# Patient Record
Sex: MALE | Race: WHITE | Employment: FULL TIME | URBAN - METROPOLITAN AREA
[De-identification: names, ages, dates, MRNs, and addresses within clinical notes are randomized per-mention and may not be internally consistent; named-entity substitution may affect disease eponyms.]

---

## 2020-12-28 ENCOUNTER — APPOINTMENT (OUTPATIENT)
Dept: GENERAL RADIOLOGY | Age: 40
End: 2020-12-28
Attending: EMERGENCY MEDICINE
Payer: COMMERCIAL

## 2020-12-28 ENCOUNTER — HOSPITAL ENCOUNTER (EMERGENCY)
Age: 40
Discharge: HOME | End: 2020-12-28
Attending: EMERGENCY MEDICINE
Payer: COMMERCIAL

## 2020-12-28 VITALS
SYSTOLIC BLOOD PRESSURE: 143 MMHG | BODY MASS INDEX: 27.4 KG/M2 | WEIGHT: 185 LBS | HEART RATE: 63 BPM | HEIGHT: 69 IN | TEMPERATURE: 98.1 F | RESPIRATION RATE: 15 BRPM | OXYGEN SATURATION: 99 % | DIASTOLIC BLOOD PRESSURE: 92 MMHG

## 2020-12-28 DIAGNOSIS — U07.1 COVID-19: Primary | ICD-10-CM

## 2020-12-28 LAB
ALBUMIN SERPL-MCNC: 4.3 G/DL (ref 3.5–5)
ALBUMIN/GLOB SERPL: 1.2 {RATIO} (ref 1.2–3.5)
ALP SERPL-CCNC: 73 U/L (ref 50–136)
ALT SERPL-CCNC: 38 U/L (ref 12–65)
ANION GAP SERPL CALC-SCNC: 5 MMOL/L (ref 7–16)
AST SERPL-CCNC: 22 U/L (ref 15–37)
BASOPHILS # BLD: 0 K/UL (ref 0–0.2)
BASOPHILS NFR BLD: 0 % (ref 0–2)
BILIRUB SERPL-MCNC: 0.3 MG/DL (ref 0.2–1.1)
BUN SERPL-MCNC: 11 MG/DL (ref 6–23)
CALCIUM SERPL-MCNC: 8.9 MG/DL (ref 8.3–10.4)
CHLORIDE SERPL-SCNC: 104 MMOL/L (ref 98–107)
CO2 SERPL-SCNC: 29 MMOL/L (ref 21–32)
CREAT SERPL-MCNC: 1 MG/DL (ref 0.8–1.5)
DIFFERENTIAL METHOD BLD: ABNORMAL
EOSINOPHIL # BLD: 0 K/UL (ref 0–0.8)
EOSINOPHIL NFR BLD: 0 % (ref 0.5–7.8)
ERYTHROCYTE [DISTWIDTH] IN BLOOD BY AUTOMATED COUNT: 12.2 % (ref 11.9–14.6)
GLOBULIN SER CALC-MCNC: 3.6 G/DL (ref 2.3–3.5)
GLUCOSE SERPL-MCNC: 103 MG/DL (ref 65–100)
HCT VFR BLD AUTO: 43.6 % (ref 41.1–50.3)
HGB BLD-MCNC: 14.8 G/DL (ref 13.6–17.2)
IMM GRANULOCYTES # BLD AUTO: 0 K/UL (ref 0–0.5)
IMM GRANULOCYTES NFR BLD AUTO: 0 % (ref 0–5)
LYMPHOCYTES # BLD: 1.9 K/UL (ref 0.5–4.6)
LYMPHOCYTES NFR BLD: 22 % (ref 13–44)
MCH RBC QN AUTO: 30 PG (ref 26.1–32.9)
MCHC RBC AUTO-ENTMCNC: 33.9 G/DL (ref 31.4–35)
MCV RBC AUTO: 88.3 FL (ref 79.6–97.8)
MONOCYTES # BLD: 0.5 K/UL (ref 0.1–1.3)
MONOCYTES NFR BLD: 6 % (ref 4–12)
NEUTS SEG # BLD: 6.1 K/UL (ref 1.7–8.2)
NEUTS SEG NFR BLD: 71 % (ref 43–78)
NRBC # BLD: 0 K/UL (ref 0–0.2)
PLATELET # BLD AUTO: 246 K/UL (ref 150–450)
PMV BLD AUTO: 10.6 FL (ref 9.4–12.3)
POTASSIUM SERPL-SCNC: 3.7 MMOL/L (ref 3.5–5.1)
PROT SERPL-MCNC: 7.9 G/DL (ref 6.3–8.2)
RBC # BLD AUTO: 4.94 M/UL (ref 4.23–5.6)
SODIUM SERPL-SCNC: 138 MMOL/L (ref 138–145)
TROPONIN-HIGH SENSITIVITY: 7.7 PG/ML (ref 0–14)
TROPONIN-HIGH SENSITIVITY: 8.2 PG/ML (ref 0–14)
WBC # BLD AUTO: 8.6 K/UL (ref 4.3–11.1)

## 2020-12-28 PROCEDURE — 85025 COMPLETE CBC W/AUTO DIFF WBC: CPT

## 2020-12-28 PROCEDURE — 80053 COMPREHEN METABOLIC PANEL: CPT

## 2020-12-28 PROCEDURE — 71046 X-RAY EXAM CHEST 2 VIEWS: CPT

## 2020-12-28 PROCEDURE — 93005 ELECTROCARDIOGRAM TRACING: CPT | Performed by: EMERGENCY MEDICINE

## 2020-12-28 PROCEDURE — 84484 ASSAY OF TROPONIN QUANT: CPT

## 2020-12-28 PROCEDURE — 99284 EMERGENCY DEPT VISIT MOD MDM: CPT

## 2020-12-28 RX ORDER — AMLODIPINE BESYLATE 5 MG/1
5 TABLET ORAL DAILY
COMMUNITY
End: 2022-04-21

## 2020-12-28 RX ORDER — ONDANSETRON 4 MG/1
4 TABLET, ORALLY DISINTEGRATING ORAL
Qty: 8 TAB | Refills: 2 | Status: SHIPPED | OUTPATIENT
Start: 2020-12-28 | End: 2022-04-21

## 2020-12-28 NOTE — DISCHARGE INSTRUCTIONS
Follow-up with your doctor as needed. Return the emergency department if your symptoms worsen. Take Motrin 600 mg every 6 hours and/or Tylenol 1000 mg every 8 hours as needed for fever or body aches.

## 2020-12-28 NOTE — ED TRIAGE NOTES
Patient arrives ambulatory to triage with mask in place. Patient reports being notified he was positive for covid this am.  Patient reports chest pain with inspiration. Reports intermittent deep cough. No fever. Took aleve yesterday.

## 2020-12-28 NOTE — ED PROVIDER NOTES
Patient states he started having sinus congestion and clear rhinorrhea yesterday. He also has had a mild cough. He started having some aches and chills today. He went to an outpatient testing center and tested positive for COVID-19. He states that he yawned today and felt a tightness in his chest.  He got concerned about it and came here for evaluation. He states his wife also has COVID-19. She has had similar symptoms as well. No past medical history on file.     Past Surgical History:   Procedure Laterality Date    HX REFRACTIVE SURGERY           Family History:   Problem Relation Age of Onset    No Known Problems Mother     No Known Problems Father     No Known Problems Sister     No Known Problems Maternal Grandmother     No Known Problems Maternal Grandfather     Heart Disease Paternal Grandmother     Heart Attack Paternal Grandfather        Social History     Socioeconomic History    Marital status:      Spouse name: Not on file    Number of children: Not on file    Years of education: Not on file    Highest education level: Not on file   Occupational History    Not on file   Social Needs    Financial resource strain: Not on file    Food insecurity     Worry: Not on file     Inability: Not on file    Transportation needs     Medical: Not on file     Non-medical: Not on file   Tobacco Use    Smoking status: Never Smoker    Smokeless tobacco: Current User     Types: Chew   Substance and Sexual Activity    Alcohol use: Yes     Comment: Sometimes     Drug use: No    Sexual activity: Yes     Partners: Female   Lifestyle    Physical activity     Days per week: Not on file     Minutes per session: Not on file    Stress: Not on file   Relationships    Social connections     Talks on phone: Not on file     Gets together: Not on file     Attends Restorationism service: Not on file     Active member of club or organization: Not on file     Attends meetings of clubs or organizations: Not on file     Relationship status: Not on file    Intimate partner violence     Fear of current or ex partner: Not on file     Emotionally abused: Not on file     Physically abused: Not on file     Forced sexual activity: Not on file   Other Topics Concern    Not on file   Social History Narrative    Not on file         ALLERGIES: Patient has no known allergies. Review of Systems   Constitutional: Positive for chills. Negative for fever. Gastrointestinal: Negative for nausea and vomiting. All other systems reviewed and are negative. Vitals:    12/28/20 1434 12/28/20 1558 12/28/20 1627   BP: (!) 143/111 (!) 144/92 132/88   Pulse: 78 69 60   Resp: 18 18    Temp: 98.1 °F (36.7 °C)     SpO2: 99% 96% 98%   Weight: 83.9 kg (185 lb)     Height: 5' 9\" (1.753 m)              Physical Exam  Vitals signs and nursing note reviewed. Constitutional:       Appearance: Normal appearance. He is well-developed. HENT:      Head: Normocephalic and atraumatic. Eyes:      Conjunctiva/sclera: Conjunctivae normal.      Pupils: Pupils are equal, round, and reactive to light. Neck:      Musculoskeletal: Normal range of motion and neck supple. Cardiovascular:      Rate and Rhythm: Normal rate and regular rhythm. Pulses: Normal pulses. Heart sounds: Normal heart sounds. Pulmonary:      Effort: Pulmonary effort is normal. No respiratory distress. Musculoskeletal: Normal range of motion. Skin:     General: Skin is warm and dry. Neurological:      Mental Status: He is alert and oriented to person, place, and time. MDM  Number of Diagnoses or Management Options  COVID-19: new and does not require workup  Diagnosis management comments: 5:03 PM discussed results with patient, unremarkable work-up. He has a primary doctor he can follow-up with.   Low clinical suspicion for PE, PERC negative       Amount and/or Complexity of Data Reviewed  Clinical lab tests: ordered and reviewed    Risk of Complications, Morbidity, and/or Mortality  Presenting problems: moderate  Diagnostic procedures: moderate  Management options: moderate    Patient Progress  Patient progress: stable         Procedures

## 2020-12-28 NOTE — ED NOTES
I have reviewed discharge instructions with the patient. The patient verbalized understanding. Patient left ED via Discharge Method: ambulatory to Home with mom    Opportunity for questions and clarification provided. Patient given 1 scripts. To continue your aftercare when you leave the hospital, you may receive an automated call from our care team to check in on how you are doing. This is a free service and part of our promise to provide the best care and service to meet your aftercare needs.  If you have questions, or wish to unsubscribe from this service please call [phone]. Thank you for Choosing our [facility] Department.

## 2020-12-29 LAB
ATRIAL RATE: 69 BPM
CALCULATED P AXIS, ECG09: 62 DEGREES
CALCULATED R AXIS, ECG10: 76 DEGREES
CALCULATED T AXIS, ECG11: 57 DEGREES
DIAGNOSIS, 93000: NORMAL
P-R INTERVAL, ECG05: 164 MS
Q-T INTERVAL, ECG07: 378 MS
QRS DURATION, ECG06: 74 MS
QTC CALCULATION (BEZET), ECG08: 405 MS
VENTRICULAR RATE, ECG03: 69 BPM

## 2022-04-21 PROBLEM — Z76.89 ESTABLISHING CARE WITH NEW DOCTOR, ENCOUNTER FOR: Status: ACTIVE | Noted: 2022-04-21

## 2022-04-21 PROBLEM — M25.512 LEFT SHOULDER PAIN: Status: ACTIVE | Noted: 2022-04-21

## 2022-05-05 PROBLEM — Z23 ENCOUNTER FOR VACCINATION: Status: ACTIVE | Noted: 2022-05-05

## 2022-05-05 PROBLEM — Z11.59 ENCOUNTER FOR HEPATITIS C SCREENING TEST FOR LOW RISK PATIENT: Status: ACTIVE | Noted: 2022-05-05

## 2022-05-05 PROBLEM — Z28.29 REFUSAL OF INFLUENZA VACCINE BY PROVIDER: Status: ACTIVE | Noted: 2022-05-05

## 2022-05-05 PROBLEM — E55.9 VITAMIN D DEFICIENCY: Status: ACTIVE | Noted: 2022-05-05

## 2022-05-05 PROBLEM — Z72.0 TOBACCO USER: Status: ACTIVE | Noted: 2022-05-05

## 2022-05-05 PROBLEM — M54.50 CHRONIC MIDLINE LOW BACK PAIN WITHOUT SCIATICA: Status: ACTIVE | Noted: 2022-05-05

## 2022-05-05 PROBLEM — M25.512 CHRONIC LEFT SHOULDER PAIN: Status: ACTIVE | Noted: 2022-04-21

## 2022-05-05 PROBLEM — R42 DIZZINESS: Status: ACTIVE | Noted: 2022-05-05

## 2022-05-05 PROBLEM — G89.29 CHRONIC LEFT SHOULDER PAIN: Status: ACTIVE | Noted: 2022-04-21

## 2022-05-05 PROBLEM — G89.29 CHRONIC MIDLINE LOW BACK PAIN WITHOUT SCIATICA: Status: ACTIVE | Noted: 2022-05-05

## 2022-06-04 PROBLEM — Z11.59 ENCOUNTER FOR HEPATITIS C SCREENING TEST FOR LOW RISK PATIENT: Status: RESOLVED | Noted: 2022-05-05 | Resolved: 2022-06-04

## 2022-06-09 ENCOUNTER — OFFICE VISIT (OUTPATIENT)
Dept: ENT CLINIC | Age: 42
End: 2022-06-09
Payer: COMMERCIAL

## 2022-06-09 VITALS
HEIGHT: 69 IN | WEIGHT: 180 LBS | SYSTOLIC BLOOD PRESSURE: 124 MMHG | DIASTOLIC BLOOD PRESSURE: 78 MMHG | RESPIRATION RATE: 18 BRPM | BODY MASS INDEX: 26.66 KG/M2

## 2022-06-09 DIAGNOSIS — R42 DISEQUILIBRIUM: ICD-10-CM

## 2022-06-09 DIAGNOSIS — H90.3 SENSORINEURAL HEARING LOSS, BILATERAL: Primary | ICD-10-CM

## 2022-06-09 DIAGNOSIS — R42 DIZZINESS: ICD-10-CM

## 2022-06-09 DIAGNOSIS — H93.13 TINNITUS OF BOTH EARS: ICD-10-CM

## 2022-06-09 PROCEDURE — 92567 TYMPANOMETRY: CPT | Performed by: AUDIOLOGIST

## 2022-06-09 PROCEDURE — 92557 COMPREHENSIVE HEARING TEST: CPT | Performed by: AUDIOLOGIST

## 2022-06-09 PROCEDURE — 99204 OFFICE O/P NEW MOD 45 MIN: CPT | Performed by: STUDENT IN AN ORGANIZED HEALTH CARE EDUCATION/TRAINING PROGRAM

## 2022-06-09 ASSESSMENT — ENCOUNTER SYMPTOMS
EYE DISCHARGE: 0
ABDOMINAL PAIN: 0
COUGH: 0

## 2022-06-09 NOTE — PROGRESS NOTES
[address] ENT Office Note    Patient: Farhan Denney  MRN: [id number]  : 1980  Gender:  male  Vital Signs: /78   Resp 18   Ht 5' 9\" (1.753 m)   Wt 180 lb (81.6 kg)   BMI 26.58 kg/m²   Date: 2022    CC: dizziness     HPI:  Farhan Denney is a 39 y.o. male he endorses dizziness that started in April. It comes on quickly then abates quickly. He denies any inciting events. He denies vertigo. He has a h/o loud noise exposure. He has bilateral tinnitus R>L. He notes slight decreased hearing, right greater than left. He recently switched from amlodipine to losartan for hypertension and feels this may be contributing to his dizziness which he describes mostly as lightheadedness. Past Medical History, Past Surgical History, Family history, Social History, and Medications were all reviewed with the patient today and updated as necessary. Allergies   Allergen Reactions    Poison [place] Extract Itching     Patient Active Problem List   Diagnosis    Establishing care with new doctor, encounter for    Hypertension    Refusal of influenza vaccine by provider    Encounter for vaccination    Tobacco user    Chronic midline low back pain without sciatica    Chronic left shoulder pain    Vitamin D deficiency    Dizziness     Current Outpatient Medications   Medication Sig    losartan (COZAAR) 100 MG tablet Take 100 mg by mouth daily    Cholecalciferol 50 MCG (2000) CAPS Take 2,000 Units by mouth daily (Patient not taking: Reported on 2022)     No current facility-administered medications for this visit.      Past Medical History:   Diagnosis Date    Hypertension      Social History     Tobacco Use    Smoking status: Never Smoker    Smokeless tobacco: Current User   Substance Use Topics    Alcohol use: Yes     Past Surgical History:   Procedure Laterality Date    REFRACTIVE SURGERY       Family History   Problem Relation Age of Onset    No Known Problems Mother     Heart Disease Paternal Grandmother     No Known Problems Father     Heart Attack Paternal Grandfather     No Known Problems Sister     No Known Problems Maternal Grandmother     No Known Problems Maternal Grandfather         ROS:    Review of Systems   Constitutional: Negative for fever. HENT: Positive for tinnitus. Negative for ear discharge and ear pain. Eyes: Negative for discharge. Respiratory: Negative for cough. Cardiovascular: Negative for chest pain. Gastrointestinal: Negative for abdominal pain. Endocrine: Negative for cold intolerance. Genitourinary: Negative for difficulty urinating. Musculoskeletal: Negative for neck pain. Skin: Negative for rash. Allergic/Immunologic: Negative for environmental allergies. Neurological: Positive for dizziness. Hematological: Negative for adenopathy. Psychiatric/Behavioral: Negative for agitation. PHYSICAL EXAM:    /78   Resp 18   Ht 5' 9\" (1.753 m)   Wt 180 lb (81.6 kg)   BMI 26.58 kg/m²     General: NAD, well-appearing  Neuro: No gross neuro deficits. CN's II-XII intact. No facial weakness. Eyes: EOMI. Pupils reactive. No periorbital edema/ecchymosis. Skin: No facial erythema, rashes or concerning lesions. Nose: No external deviations or saddling. Intranasally, septum is midline without perforations, nasal mucosa appears healthy with no erythema, mucopurulence, or polyps. Mouth: Moist mucus membranes, normal tongue/palate mobility, no concerning mucosal lesions. Oropharynx: clear with no erythema/exudate, no tonsillar hypertrophy. Ears: Normal appearing auricles, no hematomas. EACs clear with no cerumen impaction, healthy canal skin, TM's intact with no perforations or retraction pockets. No middle ear effusions. Circle-Hallpike negative bilaterally  Neck: Soft, supple, no palpable lateral neck masses. No parotid or submandibular masses. No thyromegaly or palpable thyroid nodules. No surgical scars.   Lymphatics: No palpable cervical LAD.  Resp/Lungs: No audible stridor or wheezing, CTAB  Heart: RRR  Extremities: No clubbing or cyanosis. Tympanometry     Type A -  bilaterally     Audiometry     Test Performed - Comprehensive Audiogram     Type of Loss - Right Ear: abnormal hearing: degree of loss is normal to moderate sensorineural hearing loss                           Left Ear: abnormal hearing: degree of loss is normal to moderate sensorineural hearing loss     SRT   Measurement Right Ear Left Ear   Value 15 15   Unit dB dB      Discrimination  Measurement Right Ear Left Ear   Value 100% 100%   Unit dB dB       Results for Michelle Cartagena (MRN [id number]) as of 6/9/2022 18:35   Ref. Range 4/21/2022 09:27   TSH Latest Ref Range: 0.450 - 4.500 uIU/mL 0.981     Results for Michelle Cartagena (MRN [id number]) as of 6/9/2022 18:35   Ref. Range 4/21/2022 09:27   GLUCOSE, FASTING,GF Latest Ref Range: 65 - 99 mg/dL 84   Hemoglobin A1C Latest Ref Range: 4.8 - 5.6 % 5.4   eAG (mg/dL) Latest Units: mg/dL 108     Lab Results   Component Value Date     04/21/2022    K 4.7 04/21/2022    CL 99 04/21/2022    CO2 22 04/21/2022    BUN 10 04/21/2022    CREATININE 1.07 04/21/2022    GLUCOSE 84 04/21/2022    CALCIUM 10.0 04/21/2022        ASSESSMENT and PLAN  39year-old gentleman with bilateral high-frequency sensorineural hearing loss at 4000 Hz likely related to noise-induced hearing loss. He has associated tinnitus and I gave him information about treatment strategies. No vertigo and [place]-Hallpike negative today. Dizziness described as mostly lightheadedness and may be secondary to hypertension medications. He will follow back up as needed. ICD-10-CM    1. Sensorineural hearing loss, bilateral  H90.3 COMPREHENSIVE HEARING TEST     TYMPANOMETRY   2. Dizziness  R42    3. Tinnitus of both ears  H93.13    4.  Ned Kendrick MD  6/9/2022  Electronically signed

## 2022-06-09 NOTE — PROGRESS NOTES
Dot 44 had Tympanometry and Audiometry performed today. The patient reports dizziness and tinnitus. Results as follows:    Tympanometry    Type A -  bilaterally    Audiometry    Test Performed - Comprehensive Audiogram    Type of Loss - Right Ear: abnormal hearing: degree of loss is normal to moderate sensorineural hearing loss                           Left Ear: abnormal hearing: degree of loss is normal to moderate sensorineural hearing loss    SRT   Measurement Right Ear Left Ear   Value 15 15   Unit dB dB     Discrimination  Measurement Right Ear Left Ear   Value 100% 100%   Unit dB dB     Recommend  Further testing due to asymmetry and amplification following medical clearance    A.  [id number] [facility]  Audiologist

## 2022-11-07 ENCOUNTER — OFFICE VISIT (OUTPATIENT)
Dept: INTERNAL MEDICINE CLINIC | Facility: CLINIC | Age: 42
End: 2022-11-07
Payer: COMMERCIAL

## 2022-11-07 VITALS
SYSTOLIC BLOOD PRESSURE: 122 MMHG | HEIGHT: 69 IN | TEMPERATURE: 98.6 F | WEIGHT: 180 LBS | OXYGEN SATURATION: 97 % | BODY MASS INDEX: 26.66 KG/M2 | DIASTOLIC BLOOD PRESSURE: 80 MMHG | HEART RATE: 73 BPM

## 2022-11-07 DIAGNOSIS — I10 PRIMARY HYPERTENSION: ICD-10-CM

## 2022-11-07 DIAGNOSIS — R42 DISEQUILIBRIUM: ICD-10-CM

## 2022-11-07 DIAGNOSIS — I10 ESSENTIAL (PRIMARY) HYPERTENSION: Primary | ICD-10-CM

## 2022-11-07 DIAGNOSIS — R42 DIZZINESS: ICD-10-CM

## 2022-11-07 PROCEDURE — 99213 OFFICE O/P EST LOW 20 MIN: CPT | Performed by: INTERNAL MEDICINE

## 2022-11-07 PROCEDURE — 3078F DIAST BP <80 MM HG: CPT | Performed by: INTERNAL MEDICINE

## 2022-11-07 PROCEDURE — 3074F SYST BP LT 130 MM HG: CPT | Performed by: INTERNAL MEDICINE

## 2022-11-07 RX ORDER — LOSARTAN POTASSIUM 100 MG/1
100 TABLET ORAL DAILY
Qty: 90 TABLET | Refills: 3 | Status: SHIPPED | OUTPATIENT
Start: 2022-11-07 | End: 2023-11-07

## 2022-11-07 RX ORDER — LOSARTAN POTASSIUM 50 MG/1
50 TABLET ORAL DAILY
Qty: 30 TABLET | Refills: 2 | Status: CANCELLED | OUTPATIENT
Start: 2022-11-07 | End: 2023-02-05

## 2022-11-07 ASSESSMENT — PATIENT HEALTH QUESTIONNAIRE - PHQ9
SUM OF ALL RESPONSES TO PHQ QUESTIONS 1-9: 0
SUM OF ALL RESPONSES TO PHQ9 QUESTIONS 1 & 2: 0
2. FEELING DOWN, DEPRESSED OR HOPELESS: 0
1. LITTLE INTEREST OR PLEASURE IN DOING THINGS: 0
SUM OF ALL RESPONSES TO PHQ QUESTIONS 1-9: 0

## 2022-11-07 ASSESSMENT — ENCOUNTER SYMPTOMS
VOMITING: 0
EYE PAIN: 0
NAUSEA: 0
BACK PAIN: 0
SHORTNESS OF BREATH: 0
CONSTIPATION: 0
COUGH: 0
SINUS PAIN: 0
SINUS PRESSURE: 0
DIARRHEA: 0
ABDOMINAL PAIN: 0

## 2022-11-07 ASSESSMENT — ANXIETY QUESTIONNAIRES
2. NOT BEING ABLE TO STOP OR CONTROL WORRYING: 0
4. TROUBLE RELAXING: 0
IF YOU CHECKED OFF ANY PROBLEMS ON THIS QUESTIONNAIRE, HOW DIFFICULT HAVE THESE PROBLEMS MADE IT FOR YOU TO DO YOUR WORK, TAKE CARE OF THINGS AT HOME, OR GET ALONG WITH OTHER PEOPLE: NOT DIFFICULT AT ALL
7. FEELING AFRAID AS IF SOMETHING AWFUL MIGHT HAPPEN: 0
6. BECOMING EASILY ANNOYED OR IRRITABLE: 0
5. BEING SO RESTLESS THAT IT IS HARD TO SIT STILL: 0
1. FEELING NERVOUS, ANXIOUS, OR ON EDGE: 0
GAD7 TOTAL SCORE: 0
3. WORRYING TOO MUCH ABOUT DIFFERENT THINGS: 0

## 2022-11-07 NOTE — PROGRESS NOTES
Victor M Solisdon (: 1980) presents today for:  Chief Complaint   Patient presents with    Hypertension     Follow up          Patient is a 51-year-old male with a medical history significant for hypertension, chronic low back pain without sciatica, left shoulder pain, dizziness presents for follow-up visit. At last office visit patient with concerns of dizziness while utilizing Norvasc. Patient transition to losartan with continued dizziness and a fall from a ladder. Patient referred to ear nose and throat at last office visit. Abnormal sensorineural hearing loss present bilaterally described as moderate. Patient is evaluation by ear nose and throat revealing no contributing factors to his disequilibrium. Patient previously with prescription for losartan 100 daily. Patients disequilibrium has resolved. BP well controlled in office today. Requesting patient obtain home BP monitoring device. If patient experience white coat HTN and with normal readings in office, he may be hypotensive away from office and contributing to disequilibrium. Assessment/Plan:  1. Essential (primary) hypertension    2. Disequilibrium    3. Primary hypertension    4. Dizziness        No orders of the defined types were placed in this encounter. No orders of the defined types were placed in this encounter. Return in about 1 year (around 2023). Reviewed and updated this visit by provider:         Review of Systems   Constitutional:  Negative for chills and fever. HENT:  Negative for hearing loss, postnasal drip, sinus pressure and sinus pain. Eyes:  Negative for pain and visual disturbance. Respiratory:  Negative for cough and shortness of breath. Cardiovascular:  Negative for chest pain and palpitations. Gastrointestinal:  Negative for abdominal pain, constipation, diarrhea, nausea and vomiting. Endocrine: Negative for polyuria.    Genitourinary:  Negative for difficulty urinating, frequency and urgency. Musculoskeletal:  Negative for arthralgias, back pain and neck pain. Skin:  Negative for rash and wound. Neurological:  Negative for dizziness, weakness and headaches. Psychiatric/Behavioral:  Negative for behavioral problems and sleep disturbance. The patient is not nervous/anxious. Visit Vitals  /80   Pulse 73   Temp 98.6 °F (37 °C) (Temporal)   Ht 5' 9\" (1.753 m)   Wt 180 lb (81.6 kg)   SpO2 97%   BMI 26.58 kg/m²       Physical Exam  Vitals reviewed. Constitutional:       General: He is not in acute distress. Appearance: Normal appearance. HENT:      Head: Normocephalic and atraumatic. Right Ear: External ear normal.      Left Ear: External ear normal.      Nose: Nose normal.      Mouth/Throat:      Mouth: Mucous membranes are moist.      Pharynx: Oropharynx is clear. Eyes:      Extraocular Movements: Extraocular movements intact. Conjunctiva/sclera: Conjunctivae normal.   Cardiovascular:      Rate and Rhythm: Normal rate and regular rhythm. Pulses: Normal pulses. Heart sounds: Normal heart sounds. Pulmonary:      Effort: Pulmonary effort is normal.      Breath sounds: Normal breath sounds. No wheezing. Abdominal:      General: Bowel sounds are normal.      Tenderness: There is no abdominal tenderness. Musculoskeletal:         General: No swelling or deformity. Normal range of motion. Cervical back: Normal range of motion. Skin:     General: Skin is warm and dry. Coloration: Skin is not jaundiced. Neurological:      General: No focal deficit present. Mental Status: He is alert and oriented to person, place, and time. Mental status is at baseline. Psychiatric:         Mood and Affect: Mood normal.         Behavior: Behavior normal.         Thought Content:  Thought content normal.           Nicolas Fang DO

## 2022-11-07 NOTE — ASSESSMENT & PLAN NOTE
Patient with reported dizziness while utilizing Norvasc. Transition to losartan with similar symptoms. Patient referred to ear nose and throat. Negative Kyra-Hallpike testing. No other contributing factors found for patient's dizziness. Moderate sensorineural hearing loss bilaterally present. We will decrease dosing from 100 of losartan to 50 daily.

## 2022-11-07 NOTE — ASSESSMENT & PLAN NOTE
Patient with disequilibrium utilizing Norvasc. Transition to losartan resulted in the same. Evaluation by ear nose and throat revealing no contributing factors to his disequilibrium. Audiology revealing moderate sensorineural hearing loss bilaterally. We will dose decrease losartan 100-50. Recommending patient obtain home blood pressure monitoring device for titration at home.

## 2023-05-24 ENCOUNTER — OFFICE VISIT (OUTPATIENT)
Dept: INTERNAL MEDICINE CLINIC | Facility: CLINIC | Age: 43
End: 2023-05-24
Payer: COMMERCIAL

## 2023-05-24 VITALS
HEART RATE: 66 BPM | DIASTOLIC BLOOD PRESSURE: 91 MMHG | WEIGHT: 184.8 LBS | SYSTOLIC BLOOD PRESSURE: 125 MMHG | BODY MASS INDEX: 27.37 KG/M2 | TEMPERATURE: 98.5 F | HEIGHT: 69 IN | OXYGEN SATURATION: 98 %

## 2023-05-24 DIAGNOSIS — F41.0 PANIC ATTACK: ICD-10-CM

## 2023-05-24 DIAGNOSIS — F41.1 GENERALIZED ANXIETY DISORDER: ICD-10-CM

## 2023-05-24 DIAGNOSIS — I10 PRIMARY HYPERTENSION: Primary | ICD-10-CM

## 2023-05-24 DIAGNOSIS — G89.29 CHRONIC PAIN OF BOTH KNEES: ICD-10-CM

## 2023-05-24 DIAGNOSIS — E55.9 VITAMIN D DEFICIENCY: ICD-10-CM

## 2023-05-24 DIAGNOSIS — M25.561 CHRONIC PAIN OF BOTH KNEES: ICD-10-CM

## 2023-05-24 DIAGNOSIS — M25.562 CHRONIC PAIN OF BOTH KNEES: ICD-10-CM

## 2023-05-24 DIAGNOSIS — R07.89 CHEST DISCOMFORT: ICD-10-CM

## 2023-05-24 DIAGNOSIS — R53.82 CHRONIC FATIGUE: ICD-10-CM

## 2023-05-24 PROCEDURE — 3074F SYST BP LT 130 MM HG: CPT | Performed by: INTERNAL MEDICINE

## 2023-05-24 PROCEDURE — 3080F DIAST BP >= 90 MM HG: CPT | Performed by: INTERNAL MEDICINE

## 2023-05-24 PROCEDURE — 99214 OFFICE O/P EST MOD 30 MIN: CPT | Performed by: INTERNAL MEDICINE

## 2023-05-24 RX ORDER — BUPROPION HYDROCHLORIDE 150 MG/1
150 TABLET ORAL EVERY MORNING
Qty: 30 TABLET | Refills: 3 | Status: SHIPPED | OUTPATIENT
Start: 2023-05-24

## 2023-05-24 RX ORDER — AMLODIPINE BESYLATE 5 MG/1
5 TABLET ORAL DAILY
Qty: 90 TABLET | Refills: 1 | Status: SHIPPED | OUTPATIENT
Start: 2023-05-24

## 2023-05-24 RX ORDER — HYDROXYZINE HYDROCHLORIDE 25 MG/1
25 TABLET, FILM COATED ORAL 2 TIMES DAILY PRN
Qty: 60 TABLET | Refills: 11 | Status: SHIPPED | OUTPATIENT
Start: 2023-05-24

## 2023-05-24 RX ORDER — MULTIVIT WITH MIN/MFOLATE/K2 340-15/3 G
5000 POWDER (GRAM) ORAL DAILY
Qty: 90 CAPSULE | Refills: 3 | Status: SHIPPED | OUTPATIENT
Start: 2023-05-24

## 2023-05-24 ASSESSMENT — PATIENT HEALTH QUESTIONNAIRE - PHQ9
9. THOUGHTS THAT YOU WOULD BE BETTER OFF DEAD, OR OF HURTING YOURSELF: 0
2. FEELING DOWN, DEPRESSED OR HOPELESS: 2
7. TROUBLE CONCENTRATING ON THINGS, SUCH AS READING THE NEWSPAPER OR WATCHING TELEVISION: 3
6. FEELING BAD ABOUT YOURSELF - OR THAT YOU ARE A FAILURE OR HAVE LET YOURSELF OR YOUR FAMILY DOWN: 0
8. MOVING OR SPEAKING SO SLOWLY THAT OTHER PEOPLE COULD HAVE NOTICED. OR THE OPPOSITE, BEING SO FIGETY OR RESTLESS THAT YOU HAVE BEEN MOVING AROUND A LOT MORE THAN USUAL: 3
SUM OF ALL RESPONSES TO PHQ9 QUESTIONS 1 & 2: 5
SUM OF ALL RESPONSES TO PHQ QUESTIONS 1-9: 17
5. POOR APPETITE OR OVEREATING: 0
10. IF YOU CHECKED OFF ANY PROBLEMS, HOW DIFFICULT HAVE THESE PROBLEMS MADE IT FOR YOU TO DO YOUR WORK, TAKE CARE OF THINGS AT HOME, OR GET ALONG WITH OTHER PEOPLE: 3
3. TROUBLE FALLING OR STAYING ASLEEP: 3
SUM OF ALL RESPONSES TO PHQ QUESTIONS 1-9: 17
1. LITTLE INTEREST OR PLEASURE IN DOING THINGS: 3
4. FEELING TIRED OR HAVING LITTLE ENERGY: 3

## 2023-05-24 ASSESSMENT — ANXIETY QUESTIONNAIRES
4. TROUBLE RELAXING: 3
GAD7 TOTAL SCORE: 16
5. BEING SO RESTLESS THAT IT IS HARD TO SIT STILL: 1
3. WORRYING TOO MUCH ABOUT DIFFERENT THINGS: 3
7. FEELING AFRAID AS IF SOMETHING AWFUL MIGHT HAPPEN: 3
IF YOU CHECKED OFF ANY PROBLEMS ON THIS QUESTIONNAIRE, HOW DIFFICULT HAVE THESE PROBLEMS MADE IT FOR YOU TO DO YOUR WORK, TAKE CARE OF THINGS AT HOME, OR GET ALONG WITH OTHER PEOPLE: EXTREMELY DIFFICULT
2. NOT BEING ABLE TO STOP OR CONTROL WORRYING: 1
1. FEELING NERVOUS, ANXIOUS, OR ON EDGE: 2
6. BECOMING EASILY ANNOYED OR IRRITABLE: 3

## 2023-05-24 NOTE — PROGRESS NOTES
Rhianna Jansen (: 1980) is a 43 y.o. male, here for evaluation of the following chief complaint(s): Anxiety (Pt states sx's have been present x 2 weeks and states he had a recent panic attack on ), Depression (Pt states sx's have been present over 2 weeks.), Hypertension (Pt states he's been taking medication and has improved his diet.), and Dizziness (Sx's have been present since taking Losartan and is worsening./)       ASSESSMENT/PLAN:  1. Primary hypertension  Assessment & Plan:     Key Anti-Hypertensive Meds            losartan (COZAAR) 100 MG tablet    Sig - Route: Take 1 tablet by mouth daily - Oral          Well controlled at 2022 visit. Orders:  -     amLODIPine (NORVASC) 5 MG tablet; Take 1 tablet by mouth daily, Disp-90 tablet, R-1Normal  2. Vitamin D deficiency  Assessment & Plan:     Lab Results   Component Value Date/Time    VITD25 22.0 2022 09:27 AM      Recommending 5000 IU D3 daily. Orders:  -     vitamin D (D3 5000) 125 MCG (5000 UT) CAPS capsule; Take 1 capsule by mouth daily, Disp-90 capsule, R-3Normal  3. Generalized anxiety disorder  Assessment & Plan:  Patient with concerns of worsening anxiety symptoms since last office visit. Orders:  -     hydrOXYzine HCl (ATARAX) 25 MG tablet; Take 1 tablet by mouth 2 times daily as needed for Anxiety, Disp-60 tablet, R-11Normal  -     buPROPion (WELLBUTRIN XL) 150 MG extended release tablet; Take 1 tablet by mouth every morning, Disp-30 tablet, R-3Normal  4. Panic attack  -     hydrOXYzine HCl (ATARAX) 25 MG tablet; Take 1 tablet by mouth 2 times daily as needed for Anxiety, Disp-60 tablet, R-11Normal  5. Chronic pain of both knees  6. Chronic fatigue  7. Chest discomfort  Assessment & Plan:  Patient has taken Esomeprazole for symptomatic relief. He has not had any resolution of symptoms. Addressing anxiety with Atarax at this time. He will begin as soon as he has in possession.  Will offer wellburin which he is willing to

## 2023-05-24 NOTE — ASSESSMENT & PLAN NOTE
Key Anti-Hypertensive Meds          losartan (COZAAR) 100 MG tablet    Sig - Route: Take 1 tablet by mouth daily - Oral        Well controlled at 11/2022 visit.

## 2023-05-24 NOTE — ASSESSMENT & PLAN NOTE
Patient has taken Esomeprazole for symptomatic relief. He has not had any resolution of symptoms. Addressing anxiety with Atarax at this time. He will begin as soon as he has in possession. Will offer wellburin which he is willing to start in 2 weeks time.

## 2023-05-24 NOTE — ACP (ADVANCE CARE PLANNING)
Advance Care Planning   The patient has the following advanced directives on file:  Advance Directives       Power of [address] ACP-Advance Directive ACP-Power of     Not on File Not on File Not on File Not on File            The patient has appointed the following active healthcare agents:    Primary Decision Maker: Deja Loja - [phone]    The Patient has the following current code status:    Code Status: Not on file    Visit Documentation:  I discussed [address] with Jonas Bobby Ramos today which included the importance of making their choices for care and treatment in the case of a health event that adversely affects their decision-making abilities. He has not completed the Advance Care Directives. He does not have an active health care agent at this time. Jonas Bobby Ramos was encouraged to complete the declaration forms and provide a signed copy of his medical records.          Maribell Granados  5/24/2023

## 2023-07-05 ENCOUNTER — OFFICE VISIT (OUTPATIENT)
Dept: INTERNAL MEDICINE CLINIC | Facility: CLINIC | Age: 43
End: 2023-07-05
Payer: COMMERCIAL

## 2023-07-05 VITALS
HEIGHT: 69 IN | HEART RATE: 73 BPM | TEMPERATURE: 98.3 F | BODY MASS INDEX: 27.99 KG/M2 | OXYGEN SATURATION: 97 % | DIASTOLIC BLOOD PRESSURE: 97 MMHG | SYSTOLIC BLOOD PRESSURE: 145 MMHG | WEIGHT: 189 LBS

## 2023-07-05 DIAGNOSIS — I10 PRIMARY HYPERTENSION: Primary | ICD-10-CM

## 2023-07-05 DIAGNOSIS — F41.1 GENERALIZED ANXIETY DISORDER: ICD-10-CM

## 2023-07-05 DIAGNOSIS — E55.9 VITAMIN D DEFICIENCY: ICD-10-CM

## 2023-07-05 PROCEDURE — 99214 OFFICE O/P EST MOD 30 MIN: CPT | Performed by: INTERNAL MEDICINE

## 2023-07-05 PROCEDURE — 3080F DIAST BP >= 90 MM HG: CPT | Performed by: INTERNAL MEDICINE

## 2023-07-05 PROCEDURE — 3077F SYST BP >= 140 MM HG: CPT | Performed by: INTERNAL MEDICINE

## 2023-07-05 RX ORDER — BUSPIRONE HYDROCHLORIDE 5 MG/1
5 TABLET ORAL 3 TIMES DAILY PRN
Qty: 90 TABLET | Refills: 0 | Status: SHIPPED | OUTPATIENT
Start: 2023-07-05 | End: 2023-08-04

## 2023-07-05 RX ORDER — AMLODIPINE BESYLATE 10 MG/1
10 TABLET ORAL DAILY
Qty: 90 TABLET | Refills: 1 | Status: SHIPPED | OUTPATIENT
Start: 2023-07-05

## 2023-07-05 ASSESSMENT — PATIENT HEALTH QUESTIONNAIRE - PHQ9
2. FEELING DOWN, DEPRESSED OR HOPELESS: 0
3. TROUBLE FALLING OR STAYING ASLEEP: 0
SUM OF ALL RESPONSES TO PHQ QUESTIONS 1-9: 0
SUM OF ALL RESPONSES TO PHQ QUESTIONS 1-9: 0
6. FEELING BAD ABOUT YOURSELF - OR THAT YOU ARE A FAILURE OR HAVE LET YOURSELF OR YOUR FAMILY DOWN: 0
SUM OF ALL RESPONSES TO PHQ9 QUESTIONS 1 & 2: 0
SUM OF ALL RESPONSES TO PHQ QUESTIONS 1-9: 0
1. LITTLE INTEREST OR PLEASURE IN DOING THINGS: 0
SUM OF ALL RESPONSES TO PHQ QUESTIONS 1-9: 0
5. POOR APPETITE OR OVEREATING: 0
4. FEELING TIRED OR HAVING LITTLE ENERGY: 0

## 2023-07-05 NOTE — ASSESSMENT & PLAN NOTE
Elevated diastolic recently. Key Anti-Hypertensive Meds          amLODIPine (NORVASC) 5 MG tablet    Sig - Route: Take 1 tablet by mouth daily - Oral      Increasing norvasc to 10 mg daily.

## 2023-07-05 NOTE — ASSESSMENT & PLAN NOTE
Lab Results   Component Value Date/Time    VITD25 22.0 04/21/2022 09:27 AM      .Supplementing with 5000 IU D3.

## 2023-07-05 NOTE — PROGRESS NOTES
Janet Vaughan (: 1980) is a 43 y.o. male, here for evaluation of the following chief complaint(s):  Depression (Pt states he feels medication is helping) and Anxiety (Sx's are still present but not as bad.)       ASSESSMENT/PLAN:  1. Primary hypertension  Assessment & Plan:   Elevated diastolic recently. Key Anti-Hypertensive Meds            amLODIPine (NORVASC) 5 MG tablet    Sig - Route: Take 1 tablet by mouth daily - Oral        Increasing norvasc to 10 mg daily. Orders:  -     amLODIPine (NORVASC) 10 MG tablet; Take 1 tablet by mouth daily, Disp-90 tablet, R-1Normal  2. Vitamin D deficiency  Assessment & Plan:     Lab Results   Component Value Date/Time    VITD25 22.0 2022 09:27 AM      .Supplementing with 5000 IU D3.   3. Generalized anxiety disorder  Assessment & Plan:  Patient with anxiety prominent. Tolerant of Wellbutrin 150 daily. Patient has been tolerant of Atarax 25 bid prn as well. Doing well with wellbutrin. Sleeping well. Forward thinking. Health conscious. Will offer wean once stable for 6 consecutive months. Anxiety prominent currently Atarax with heavy sedation properties for patient. Offering Buspar 5 mg tid prn at this time. Orders:  -     busPIRone (BUSPAR) 5 MG tablet; Take 1 tablet by mouth 3 times daily as needed (anxiety), Disp-90 tablet, R-0Normal             SUBJECTIVE/OBJECTIVE:  HPI: Patient is a pleasant 41-year-old male with a medical history significant for hypertension, generalized anxiety disorder with panic features, and chronic pain of both knees that presents for routine follow-up visit. Patient has been maintained on Wellbutrin 150 with hydroxyzine 25 twice daily as needed. Patients blood pressure at home is much better. Patient has been doing a really good job identifying his anxiety triggers. He has found avoidance of those triggers to be very helpful. His father, caffeine, alcohol the night prior.   Offering Buspar 5 mf tid prn at this time

## 2023-07-05 NOTE — ACP (ADVANCE CARE PLANNING)
Advance Care Planning   The patient has the following advanced directives on file:  Advance Directives       Power of [address] [place] ACP-Advance Directive ACP-Power of     Not on File Not on File Not on File Not on File            The patient has appointed the following active healthcare agents:    Primary Decision Maker: Charis Sanchez - [phone]    The Patient has the following current code status:    Code Status: Not on file    Visit Documentation:  I discussed [address] [place] [address] with [address] today which included the importance of making their choices for care and treatment in the case of a health event that adversely affects their decision-making abilities. He has not completed the Advance Care Directives. He does not have an active health care agent at this time. [address] was encouraged to complete the declaration forms and provide a signed copy of his medical records.       Maribell Granados  7/5/2023

## 2023-07-05 NOTE — ASSESSMENT & PLAN NOTE
Patient with anxiety prominent. Tolerant of Wellbutrin 150 daily. Patient has been tolerant of Atarax 25 bid prn as well. Doing well with wellbutrin. Sleeping well. Forward thinking. Health conscious. Will offer wean once stable for 6 consecutive months. Anxiety prominent currently Atarax with heavy sedation properties for patient. Offering Buspar 5 mg tid prn at this time.

## 2023-08-16 DIAGNOSIS — F41.1 GENERALIZED ANXIETY DISORDER: ICD-10-CM

## 2023-08-16 RX ORDER — BUPROPION HYDROCHLORIDE 150 MG/1
150 TABLET ORAL EVERY MORNING
Qty: 30 TABLET | Refills: 0 | Status: SHIPPED | OUTPATIENT
Start: 2023-08-16

## 2023-11-02 ENCOUNTER — OFFICE VISIT (OUTPATIENT)
Dept: INTERNAL MEDICINE CLINIC | Facility: CLINIC | Age: 43
End: 2023-11-02
Payer: COMMERCIAL

## 2023-11-02 VITALS
OXYGEN SATURATION: 97 % | TEMPERATURE: 98.4 F | DIASTOLIC BLOOD PRESSURE: 87 MMHG | HEIGHT: 69 IN | HEART RATE: 76 BPM | WEIGHT: 184 LBS | BODY MASS INDEX: 27.25 KG/M2 | SYSTOLIC BLOOD PRESSURE: 127 MMHG

## 2023-11-02 DIAGNOSIS — E55.9 VITAMIN D DEFICIENCY: ICD-10-CM

## 2023-11-02 DIAGNOSIS — I10 PRIMARY HYPERTENSION: ICD-10-CM

## 2023-11-02 PROCEDURE — 3074F SYST BP LT 130 MM HG: CPT | Performed by: INTERNAL MEDICINE

## 2023-11-02 PROCEDURE — 3079F DIAST BP 80-89 MM HG: CPT | Performed by: INTERNAL MEDICINE

## 2023-11-02 PROCEDURE — 99214 OFFICE O/P EST MOD 30 MIN: CPT | Performed by: INTERNAL MEDICINE

## 2023-11-02 RX ORDER — AMLODIPINE BESYLATE 5 MG/1
5 TABLET ORAL DAILY
Qty: 90 TABLET | Refills: 1 | Status: SHIPPED | OUTPATIENT
Start: 2023-11-02

## 2023-11-02 RX ORDER — BUTYROSPERMUM PARKII(SHEA BUTTER), SIMMONDSIA CHINENSIS (JOJOBA) SEED OIL, ALOE BARBADENSIS LEAF EXTRACT .01; 1; 3.5 G/100G; G/100G; G/100G
5000 LIQUID TOPICAL DAILY
Qty: 90 CAPSULE | Refills: 3 | Status: SHIPPED | OUTPATIENT
Start: 2023-11-02

## 2023-11-02 SDOH — ECONOMIC STABILITY: INCOME INSECURITY: HOW HARD IS IT FOR YOU TO PAY FOR THE VERY BASICS LIKE FOOD, HOUSING, MEDICAL CARE, AND HEATING?: NOT HARD AT ALL

## 2023-11-02 SDOH — ECONOMIC STABILITY: FOOD INSECURITY: WITHIN THE PAST 12 MONTHS, THE FOOD YOU BOUGHT JUST DIDN'T LAST AND YOU DIDN'T HAVE MONEY TO GET MORE.: NEVER TRUE

## 2023-11-02 SDOH — ECONOMIC STABILITY: FOOD INSECURITY: WITHIN THE PAST 12 MONTHS, YOU WORRIED THAT YOUR FOOD WOULD RUN OUT BEFORE YOU GOT MONEY TO BUY MORE.: NEVER TRUE

## 2023-11-02 SDOH — ECONOMIC STABILITY: HOUSING INSECURITY
IN THE LAST 12 MONTHS, WAS THERE A TIME WHEN YOU DID NOT HAVE A STEADY PLACE TO SLEEP OR SLEPT IN A SHELTER (INCLUDING NOW)?: NO

## 2023-11-02 ASSESSMENT — PATIENT HEALTH QUESTIONNAIRE - PHQ9
SUM OF ALL RESPONSES TO PHQ QUESTIONS 1-9: 0
1. LITTLE INTEREST OR PLEASURE IN DOING THINGS: 0
SUM OF ALL RESPONSES TO PHQ9 QUESTIONS 1 & 2: 0
2. FEELING DOWN, DEPRESSED OR HOPELESS: 0
SUM OF ALL RESPONSES TO PHQ QUESTIONS 1-9: 0

## 2023-11-02 ASSESSMENT — ANXIETY QUESTIONNAIRES
GAD7 TOTAL SCORE: 0
3. WORRYING TOO MUCH ABOUT DIFFERENT THINGS: 0
6. BECOMING EASILY ANNOYED OR IRRITABLE: 0
5. BEING SO RESTLESS THAT IT IS HARD TO SIT STILL: 0
7. FEELING AFRAID AS IF SOMETHING AWFUL MIGHT HAPPEN: 0
2. NOT BEING ABLE TO STOP OR CONTROL WORRYING: 0
1. FEELING NERVOUS, ANXIOUS, OR ON EDGE: 0
IF YOU CHECKED OFF ANY PROBLEMS ON THIS QUESTIONNAIRE, HOW DIFFICULT HAVE THESE PROBLEMS MADE IT FOR YOU TO DO YOUR WORK, TAKE CARE OF THINGS AT HOME, OR GET ALONG WITH OTHER PEOPLE: NOT DIFFICULT AT ALL
4. TROUBLE RELAXING: 0

## 2023-11-02 NOTE — PROGRESS NOTES
Skin:     General: Skin is warm and dry. Neurological:      Mental Status: He is alert. An electronic signature was used to authenticate this note.   Valeri Power DO

## 2023-12-29 DIAGNOSIS — I10 PRIMARY HYPERTENSION: ICD-10-CM

## 2023-12-29 RX ORDER — AMLODIPINE BESYLATE 10 MG/1
10 TABLET ORAL DAILY
Qty: 90 TABLET | Refills: 1 | OUTPATIENT
Start: 2023-12-29

## 2024-01-29 DIAGNOSIS — I10 PRIMARY HYPERTENSION: ICD-10-CM

## 2024-01-30 RX ORDER — AMLODIPINE BESYLATE 5 MG/1
5 TABLET ORAL DAILY
Qty: 90 TABLET | Refills: 1 | Status: SHIPPED | OUTPATIENT
Start: 2024-01-30

## 2024-01-30 NOTE — TELEPHONE ENCOUNTER
As of last office note (11/02/2023), patient is still taking this medication and will be out of medication by their appointment (05/02/2024). Pended medication has correct quantity and pended to preferred pharmacy.    
[place]

## 2024-05-02 ENCOUNTER — OFFICE VISIT (OUTPATIENT)
Dept: INTERNAL MEDICINE CLINIC | Facility: CLINIC | Age: 44
End: 2024-05-02
Payer: COMMERCIAL

## 2024-05-02 VITALS
OXYGEN SATURATION: 98 % | HEIGHT: 69 IN | BODY MASS INDEX: 27.34 KG/M2 | RESPIRATION RATE: 16 BRPM | WEIGHT: 184.6 LBS | HEART RATE: 70 BPM | SYSTOLIC BLOOD PRESSURE: 135 MMHG | DIASTOLIC BLOOD PRESSURE: 91 MMHG

## 2024-05-02 DIAGNOSIS — I10 PRIMARY HYPERTENSION: ICD-10-CM

## 2024-05-02 DIAGNOSIS — F41.9 ANXIETY: Primary | ICD-10-CM

## 2024-05-02 DIAGNOSIS — E55.9 VITAMIN D DEFICIENCY: ICD-10-CM

## 2024-05-02 PROCEDURE — 3080F DIAST BP >= 90 MM HG: CPT | Performed by: INTERNAL MEDICINE

## 2024-05-02 PROCEDURE — 99214 OFFICE O/P EST MOD 30 MIN: CPT | Performed by: INTERNAL MEDICINE

## 2024-05-02 PROCEDURE — 3075F SYST BP GE 130 - 139MM HG: CPT | Performed by: INTERNAL MEDICINE

## 2024-05-02 RX ORDER — BUSPIRONE HYDROCHLORIDE 5 MG/1
5 TABLET ORAL 3 TIMES DAILY
Qty: 270 TABLET | Refills: 1 | Status: SHIPPED | OUTPATIENT
Start: 2024-05-02 | End: 2024-10-29

## 2024-05-02 RX ORDER — BUTYROSPERMUM PARKII(SHEA BUTTER), SIMMONDSIA CHINENSIS (JOJOBA) SEED OIL, ALOE BARBADENSIS LEAF EXTRACT .01; 1; 3.5 G/100G; G/100G; G/100G
5000 LIQUID TOPICAL DAILY
Qty: 90 CAPSULE | Refills: 3 | Status: SHIPPED | OUTPATIENT
Start: 2024-05-02

## 2024-05-02 RX ORDER — AMLODIPINE BESYLATE 5 MG/1
5 TABLET ORAL DAILY
Qty: 90 TABLET | Refills: 1 | Status: SHIPPED | OUTPATIENT
Start: 2024-05-02

## 2024-05-02 NOTE — PROGRESS NOTES
Reinaldo Snow (: 1980) is a 43 y.o. male, here for evaluation of the following chief complaint(s):  Hypertension       ASSESSMENT/PLAN:  1. Anxiety  -     busPIRone (BUSPAR) 5 MG tablet; Take 1 tablet by mouth 3 times daily, Disp-270 tablet, R-1Normal  2. Vitamin D deficiency  -     vitamin D (D3 5000) 125 MCG (5000 UT) CAPS capsule; Take 1 capsule by mouth daily, Disp-90 capsule, R-3Normal  3. Primary hypertension  -     amLODIPine (NORVASC) 5 MG tablet; Take 1 tablet by mouth daily, Disp-90 tablet, R-1Normal             SUBJECTIVE/OBJECTIVE:  HPI: Patient is a very pleasant 43-year-old male with medical history significant for anxiety and hypertension presenting for follow-up visit.  Patient blood pressure slightly elevated in office today 135/91.  Patient states his blood pressure typically is elevated in the office setting.  He has been checking blood pressures at home and getting readings within the normal range.  Will continue amlodipine.  Discussing potential options including amlodipine combined with ACE inhibitor or angiotensin receptor blockade if necessary in the future.  Patient's use of BuSpar has been limited to only days in which she is attending [employer].  Recommending he continue use of BuSpar on a more regular and consistent basis.  Patient is agreeable to consideration trial in the future.  Return to clinic in 6 months time.  Social History     Tobacco Use    Smoking status: Never    Smokeless tobacco: Current   Vaping Use    Vaping Use: Never used   Substance Use Topics    Alcohol use: Yes    Drug use: No     Vitals:    24 1459   BP: (!) 135/91   Site: Right Upper Arm   Position: Sitting   Cuff Size: Medium Adult   Pulse: 70   Resp: 16   SpO2: 98%   Weight: 83.7 kg (184 lb 9.6 oz)   Height: 1.753 m (5' 9\")      Body mass index is 27.26 kg/m².  Physical Exam  Vitals and nursing note reviewed.   Constitutional:       Appearance: Normal appearance.      Comments: Athletic build

## 2024-12-03 ENCOUNTER — TELEPHONE (OUTPATIENT)
Dept: INTERNAL MEDICINE CLINIC | Facility: CLINIC | Age: 44
End: 2024-12-03

## 2024-12-03 NOTE — TELEPHONE ENCOUNTER
----- Message from Hai DECKER sent at 12/2/2024 11:10 AM EST -----  Regarding: [facility] Appointment Request  [facility] Appointment Request    Patient needs appointment for [facility] Appointment Type: New to Provider.    Patient Requested Dates(s): January 29, 2024  Patient Requested Time: 2:30 PM  Provider Name: Ebenezer Florentino MD    Reason for Appointment Request: Established Patient - No appointments available during search/ wanted to book an appointment with the same provider with her wife.   --------------------------------------------------------------------------------------------------------------------------    Relationship to Patient: Spouse/Partner : [place]     Call Back Information: OK to leave message on voicemail  Preferred Call Back Number: [phone]

## 2024-12-03 NOTE — TELEPHONE ENCOUNTER
Spoke to patient, he is a previous patient of Dr Lockhart, would like an NTP appointment with Dr Florentino, the same day as his wife.  At this time there is not another new patient available that afternoon, but would be happy to schedule him for another day.  Mr Snow then stated that he was going  to run out of his BP medication and would like to have a refill called in by somebody. Mr Snow was told that he would need to become established with another provider before medication could be prescribed.  The telephone number for [facility], they would be able to schedule an appointment to give him a prescription for his medications, to last until his appointment with Dr Florentino - this was refused.  Also refused was an earlier NTP appointment with Dr Florentino in the end of December.